# Patient Record
Sex: FEMALE | Race: WHITE | NOT HISPANIC OR LATINO | Employment: OTHER | URBAN - METROPOLITAN AREA
[De-identification: names, ages, dates, MRNs, and addresses within clinical notes are randomized per-mention and may not be internally consistent; named-entity substitution may affect disease eponyms.]

---

## 2019-08-26 ENCOUNTER — NEW PATIENT COMPREHENSIVE (OUTPATIENT)
Dept: URBAN - METROPOLITAN AREA CLINIC 46 | Facility: CLINIC | Age: 69
End: 2019-08-26

## 2019-08-26 DIAGNOSIS — H25.813: ICD-10-CM

## 2019-08-26 PROCEDURE — 92015 DETERMINE REFRACTIVE STATE: CPT

## 2019-08-26 PROCEDURE — 92004 COMPRE OPH EXAM NEW PT 1/>: CPT

## 2019-08-26 ASSESSMENT — VISUAL ACUITY
OD_CC: 20/40+1
OS_BAT: 20/200
OS_SC: <J12
OS_OTHER: FL-.25
OS_CC: 20/40+1
OD_CC: J1
OD_SC: 20/200
OS_CC: J1
OS_SC: 20/100
OD_BAT: 20/100
OD_OTHER: FL+.25
OD_SC: <J12

## 2019-08-26 ASSESSMENT — TONOMETRY
OS_IOP_MMHG: 16
OD_IOP_MMHG: 16

## 2019-09-19 ENCOUNTER — CATARACT CONSULT (OUTPATIENT)
Dept: URBAN - METROPOLITAN AREA CLINIC 46 | Facility: CLINIC | Age: 69
End: 2019-09-19

## 2019-09-19 VITALS — HEART RATE: 94 BPM | DIASTOLIC BLOOD PRESSURE: 93 MMHG | SYSTOLIC BLOOD PRESSURE: 159 MMHG | HEIGHT: 55 IN

## 2019-09-19 DIAGNOSIS — H25.813: ICD-10-CM

## 2019-09-19 DIAGNOSIS — H02.831: ICD-10-CM

## 2019-09-19 DIAGNOSIS — H35.3131: ICD-10-CM

## 2019-09-19 DIAGNOSIS — H40.033: ICD-10-CM

## 2019-09-19 PROCEDURE — 92025-3 CORNEAL TOPO, REFUSED

## 2019-09-19 PROCEDURE — 92134 CPTRZ OPH DX IMG PST SGM RTA: CPT

## 2019-09-19 PROCEDURE — 92014 COMPRE OPH EXAM EST PT 1/>: CPT

## 2019-09-19 PROCEDURE — 92020 GONIOSCOPY: CPT

## 2019-09-19 PROCEDURE — 92136TC INTERFEROMETRY - TECHNICAL COMPONENT

## 2019-09-19 RX ORDER — AMOXICILLIN 500 MG/1: 1 CAPSULE ORAL

## 2019-09-19 RX ORDER — NEPAFENAC 3 MG/ML: 1 SUSPENSION/ DROPS OPHTHALMIC ONCE A DAY

## 2019-09-19 RX ORDER — PREDNISOLONE ACETATE 10 MG/ML: 1 SUSPENSION/ DROPS OPHTHALMIC

## 2019-09-20 ASSESSMENT — VISUAL ACUITY
OD_SC: >J12
OS_SC: 20/100
OD_CC: J1
OS_CC: 20/30-1
OS_BAT: 20/200
OS_AM: 20/30
OD_CC: 20/25-2
OS_CC: J1
OS_SC: >J12
OD_RAM: 20/25
OD_SC: 20/200
OD_BAT: 20/100

## 2019-09-20 ASSESSMENT — TONOMETRY
OD_IOP_MMHG: 14
OS_IOP_MMHG: 14

## 2019-12-02 ENCOUNTER — SURGERY/PROCEDURE (OUTPATIENT)
Dept: URBAN - METROPOLITAN AREA CLINIC 46 | Facility: CLINIC | Age: 69
End: 2019-12-02

## 2019-12-02 ENCOUNTER — POST-OP CATARACT (OUTPATIENT)
Dept: URBAN - METROPOLITAN AREA CLINIC 39 | Facility: CLINIC | Age: 69
End: 2019-12-02

## 2019-12-02 ENCOUNTER — PRE-OP/H&P (OUTPATIENT)
Dept: URBAN - METROPOLITAN AREA CLINIC 39 | Facility: CLINIC | Age: 69
End: 2019-12-02

## 2019-12-02 DIAGNOSIS — H40.033: ICD-10-CM

## 2019-12-02 DIAGNOSIS — H25.813: ICD-10-CM

## 2019-12-02 DIAGNOSIS — H25.811: ICD-10-CM

## 2019-12-02 DIAGNOSIS — Z96.1: ICD-10-CM

## 2019-12-02 DIAGNOSIS — H25.812: ICD-10-CM

## 2019-12-02 PROCEDURE — 66984 XCAPSL CTRC RMVL W/O ECP: CPT

## 2019-12-02 PROCEDURE — 66999PO NON CO-MANAGED OTHER SURGERY PO

## 2019-12-02 PROCEDURE — 99499 UNLISTED E&M SERVICE: CPT

## 2019-12-02 ASSESSMENT — TONOMETRY: OS_IOP_MMHG: 15

## 2019-12-02 ASSESSMENT — VISUAL ACUITY: OS_SC: 20/30-2

## 2019-12-04 ENCOUNTER — CATARACT POST-OP 1-DAY (OUTPATIENT)
Dept: URBAN - METROPOLITAN AREA CLINIC 46 | Facility: CLINIC | Age: 69
End: 2019-12-04

## 2019-12-04 DIAGNOSIS — H40.033: ICD-10-CM

## 2019-12-04 DIAGNOSIS — Z96.1: ICD-10-CM

## 2019-12-04 PROCEDURE — 99024 POSTOP FOLLOW-UP VISIT: CPT

## 2019-12-04 ASSESSMENT — VISUAL ACUITY
OS_SC: J5
OS_SC: 20/30
OD_SC: 20/200
OD_PH: 20/25

## 2019-12-04 ASSESSMENT — TONOMETRY
OS_IOP_MMHG: 14
OD_IOP_MMHG: 13

## 2019-12-10 ENCOUNTER — SURGERY/PROCEDURE (OUTPATIENT)
Dept: URBAN - METROPOLITAN AREA CLINIC 46 | Facility: CLINIC | Age: 69
End: 2019-12-10

## 2019-12-10 ENCOUNTER — POST-OP CATARACT (OUTPATIENT)
Dept: URBAN - METROPOLITAN AREA CLINIC 39 | Facility: CLINIC | Age: 69
End: 2019-12-10

## 2019-12-10 DIAGNOSIS — H25.811: ICD-10-CM

## 2019-12-10 DIAGNOSIS — Z96.1: ICD-10-CM

## 2019-12-10 DIAGNOSIS — H40.033: ICD-10-CM

## 2019-12-10 PROCEDURE — 66984 XCAPSL CTRC RMVL W/O ECP: CPT

## 2019-12-10 ASSESSMENT — VISUAL ACUITY: OS_SC: 20/30+1

## 2019-12-10 ASSESSMENT — TONOMETRY: OS_IOP_MMHG: 16

## 2019-12-11 ENCOUNTER — CATARACT POST-OP 1-DAY (OUTPATIENT)
Dept: URBAN - METROPOLITAN AREA CLINIC 46 | Facility: CLINIC | Age: 69
End: 2019-12-11

## 2019-12-11 DIAGNOSIS — Z96.1: ICD-10-CM

## 2019-12-11 PROCEDURE — 99024 POSTOP FOLLOW-UP VISIT: CPT

## 2019-12-11 ASSESSMENT — TONOMETRY
OD_IOP_MMHG: 17
OS_IOP_MMHG: 17

## 2019-12-11 ASSESSMENT — VISUAL ACUITY
OD_SC: 20/25-1
OS_SC: 20/25-1

## 2020-01-06 ENCOUNTER — POST-OP (OUTPATIENT)
Dept: URBAN - METROPOLITAN AREA CLINIC 46 | Facility: CLINIC | Age: 70
End: 2020-01-06

## 2020-01-06 DIAGNOSIS — H04.123: ICD-10-CM

## 2020-01-06 DIAGNOSIS — J30.0: ICD-10-CM

## 2020-01-06 DIAGNOSIS — Z96.1: ICD-10-CM

## 2020-01-06 PROCEDURE — 99024 POSTOP FOLLOW-UP VISIT: CPT

## 2020-01-06 ASSESSMENT — VISUAL ACUITY
OS_CC: J1
OU_SC: 20/25+3
OS_SC: J4
OD_SC: J5
OU_CC: J1
OD_CC: J2
OU_SC: J3
OD_SC: 20/25
OS_SC: 20/25-1

## 2020-01-06 ASSESSMENT — TONOMETRY
OS_IOP_MMHG: 14
OD_IOP_MMHG: 14

## 2020-07-16 ENCOUNTER — ESTABLISHED COMPREHENSIVE EXAM (OUTPATIENT)
Dept: URBAN - METROPOLITAN AREA CLINIC 46 | Facility: CLINIC | Age: 70
End: 2020-07-16

## 2020-07-16 VITALS — HEIGHT: 55 IN | DIASTOLIC BLOOD PRESSURE: 88 MMHG | SYSTOLIC BLOOD PRESSURE: 145 MMHG

## 2020-07-16 DIAGNOSIS — H04.123: ICD-10-CM

## 2020-07-16 DIAGNOSIS — H26.493: ICD-10-CM

## 2020-07-16 DIAGNOSIS — H52.7: ICD-10-CM

## 2020-07-16 DIAGNOSIS — H35.3131: ICD-10-CM

## 2020-07-16 DIAGNOSIS — H35.61: ICD-10-CM

## 2020-07-16 PROCEDURE — 92250 FUNDUS PHOTOGRAPHY W/I&R: CPT

## 2020-07-16 PROCEDURE — 92015 DETERMINE REFRACTIVE STATE: CPT

## 2020-07-16 PROCEDURE — 92014 COMPRE OPH EXAM EST PT 1/>: CPT

## 2020-07-16 ASSESSMENT — VISUAL ACUITY
OD_SC: J3
OS_SC: J3
OD_SC: 20/25-1
OS_SC: 20/40

## 2020-07-16 ASSESSMENT — TONOMETRY
OS_IOP_MMHG: 16
OD_IOP_MMHG: 17

## 2020-10-21 ENCOUNTER — FOLLOW UP (OUTPATIENT)
Dept: URBAN - METROPOLITAN AREA CLINIC 46 | Facility: CLINIC | Age: 70
End: 2020-10-21

## 2020-10-21 DIAGNOSIS — H35.3131: ICD-10-CM

## 2020-10-21 DIAGNOSIS — H35.61: ICD-10-CM

## 2020-10-21 PROCEDURE — 92012 INTRM OPH EXAM EST PATIENT: CPT

## 2020-10-21 ASSESSMENT — VISUAL ACUITY
OD_SC: 20/25-1
OS_SC: 20/30-1

## 2020-10-21 ASSESSMENT — TONOMETRY
OS_IOP_MMHG: 16
OD_IOP_MMHG: 16

## 2021-07-01 ENCOUNTER — ESTABLISHED PATIENT (OUTPATIENT)
Dept: URBAN - METROPOLITAN AREA CLINIC 36 | Facility: CLINIC | Age: 71
End: 2021-07-01

## 2021-07-01 DIAGNOSIS — H02.832: ICD-10-CM

## 2021-07-01 DIAGNOSIS — H02.834: ICD-10-CM

## 2021-07-01 DIAGNOSIS — H02.831: ICD-10-CM

## 2021-07-01 DIAGNOSIS — H02.835: ICD-10-CM

## 2021-07-01 PROCEDURE — 99213 OFFICE O/P EST LOW 20 MIN: CPT

## 2021-07-01 PROCEDURE — 92285 EXTERNAL OCULAR PHOTOGRAPHY: CPT

## 2021-07-15 ENCOUNTER — VISUAL FIELD ONLY (OUTPATIENT)
Dept: URBAN - METROPOLITAN AREA CLINIC 46 | Facility: CLINIC | Age: 71
End: 2021-07-15

## 2021-07-15 DIAGNOSIS — H02.832: ICD-10-CM

## 2021-07-15 DIAGNOSIS — H02.834: ICD-10-CM

## 2021-07-15 DIAGNOSIS — H02.831: ICD-10-CM

## 2021-07-15 DIAGNOSIS — H35.61: ICD-10-CM

## 2021-07-15 DIAGNOSIS — H35.3131: ICD-10-CM

## 2021-07-15 DIAGNOSIS — H02.835: ICD-10-CM

## 2021-07-15 PROCEDURE — 92082 INTERMEDIATE VISUAL FIELD XM: CPT

## 2021-07-15 PROCEDURE — 99211T TECH SERVICE

## 2021-10-21 ENCOUNTER — PRE-OP/H&P (OUTPATIENT)
Dept: URBAN - METROPOLITAN AREA CLINIC 44 | Facility: CLINIC | Age: 71
End: 2021-10-21

## 2021-10-21 DIAGNOSIS — H02.832: ICD-10-CM

## 2021-10-21 DIAGNOSIS — H35.3131: ICD-10-CM

## 2021-10-21 DIAGNOSIS — H02.834: ICD-10-CM

## 2021-10-21 DIAGNOSIS — H35.61: ICD-10-CM

## 2021-10-21 DIAGNOSIS — H02.835: ICD-10-CM

## 2021-10-21 DIAGNOSIS — H02.831: ICD-10-CM

## 2021-10-21 PROCEDURE — 99211HP H&P OFFICE/OUTPATIENT VISIT, EST

## 2021-10-21 RX ORDER — TRAMADOL HCL 50 MG/1
1 TABLET ORAL
Start: 2021-11-15

## 2021-10-21 RX ORDER — ERYTHROMYCIN 5 MG/G
OINTMENT OPHTHALMIC
Start: 2021-11-15

## 2021-11-15 ENCOUNTER — PRE-OP/H&P (OUTPATIENT)
Dept: URBAN - METROPOLITAN AREA SURGERY 14 | Facility: SURGERY | Age: 71
End: 2021-11-15

## 2021-11-15 ENCOUNTER — SURGERY/PROCEDURE (OUTPATIENT)
Dept: URBAN - METROPOLITAN AREA SURGERY 14 | Facility: SURGERY | Age: 71
End: 2021-11-15

## 2021-11-15 DIAGNOSIS — H02.834: ICD-10-CM

## 2021-11-15 DIAGNOSIS — H02.831: ICD-10-CM

## 2021-11-15 PROCEDURE — 1582350 UPPER BLEPH PER EYE FUNCTIONAL-BILATERAL

## 2021-11-15 PROCEDURE — 99211T TECH SERVICE

## 2021-11-22 ENCOUNTER — POST-OP (OUTPATIENT)
Dept: URBAN - METROPOLITAN AREA CLINIC 44 | Facility: CLINIC | Age: 71
End: 2021-11-22

## 2021-11-22 DIAGNOSIS — H02.834: ICD-10-CM

## 2021-11-22 DIAGNOSIS — H35.61: ICD-10-CM

## 2021-11-22 DIAGNOSIS — H02.831: ICD-10-CM

## 2021-11-22 DIAGNOSIS — H02.832: ICD-10-CM

## 2021-11-22 DIAGNOSIS — H35.3131: ICD-10-CM

## 2021-11-22 DIAGNOSIS — H02.835: ICD-10-CM

## 2021-11-22 DIAGNOSIS — Z98.890: ICD-10-CM

## 2021-11-22 PROCEDURE — 99024 POSTOP FOLLOW-UP VISIT: CPT

## 2021-11-22 PROCEDURE — 92285 EXTERNAL OCULAR PHOTOGRAPHY: CPT

## 2021-11-22 ASSESSMENT — VISUAL ACUITY
OS_SC: 20/20
OD_SC: 20/20

## 2022-09-13 NOTE — PATIENT DISCUSSION
GPC due to over wear of CL's . No ulcer noted in todays exam .  Recommend CL holiday until symptoms resolve  .